# Patient Record
Sex: MALE | Race: BLACK OR AFRICAN AMERICAN | Employment: FULL TIME | URBAN - METROPOLITAN AREA
[De-identification: names, ages, dates, MRNs, and addresses within clinical notes are randomized per-mention and may not be internally consistent; named-entity substitution may affect disease eponyms.]

---

## 2024-12-10 ENCOUNTER — OFFICE VISIT (OUTPATIENT)
Dept: ENDOCRINOLOGY | Age: 44
End: 2024-12-10

## 2024-12-10 VITALS
HEART RATE: 90 BPM | SYSTOLIC BLOOD PRESSURE: 141 MMHG | WEIGHT: 315 LBS | DIASTOLIC BLOOD PRESSURE: 87 MMHG | BODY MASS INDEX: 39.17 KG/M2 | HEIGHT: 75 IN | OXYGEN SATURATION: 94 %

## 2024-12-10 DIAGNOSIS — E11.9 TYPE 2 DIABETES MELLITUS WITHOUT COMPLICATION, UNSPECIFIED WHETHER LONG TERM INSULIN USE (HCC): Primary | ICD-10-CM

## 2024-12-10 LAB
CHP ED QC CHECK: NORMAL
GLUCOSE BLD-MCNC: 215 MG/DL

## 2024-12-10 PROCEDURE — 82962 GLUCOSE BLOOD TEST: CPT | Performed by: PHYSICIAN ASSISTANT

## 2024-12-10 PROCEDURE — 99243 OFF/OP CNSLTJ NEW/EST LOW 30: CPT | Performed by: PHYSICIAN ASSISTANT

## 2024-12-10 RX ORDER — SIMVASTATIN 40 MG
40 TABLET ORAL NIGHTLY
COMMUNITY

## 2024-12-10 RX ORDER — ORAL SEMAGLUTIDE 3 MG/1
1 TABLET ORAL
Qty: 30 TABLET | Refills: 5 | Status: SHIPPED | OUTPATIENT
Start: 2024-12-10

## 2024-12-10 RX ORDER — HYDROCHLOROTHIAZIDE 12.5 MG/1
12.5 CAPSULE ORAL EVERY MORNING
COMMUNITY

## 2024-12-10 RX ORDER — ATORVASTATIN CALCIUM 40 MG/1
1 TABLET, FILM COATED ORAL
COMMUNITY

## 2024-12-10 RX ORDER — AMLODIPINE BESYLATE 10 MG/1
10 TABLET ORAL DAILY
COMMUNITY

## 2024-12-10 RX ORDER — LISINOPRIL 30 MG/1
1 TABLET ORAL
COMMUNITY

## 2024-12-10 RX ORDER — GLIMEPIRIDE 2 MG/1
2 TABLET ORAL
COMMUNITY

## 2024-12-10 ASSESSMENT — ENCOUNTER SYMPTOMS
SINUS PRESSURE: 0
DIARRHEA: 0
SORE THROAT: 0
RHINORRHEA: 0
WHEEZING: 0
SHORTNESS OF BREATH: 0
NAUSEA: 0
VOMITING: 0
COUGH: 0
ABDOMINAL PAIN: 0

## 2024-12-10 NOTE — PATIENT INSTRUCTIONS
Sitagliptin 100 mg by mouth daily  Metformin 1000 mg by mouth twice daily before breakfast and dinner  Glipizide 10 mg by mouth daily before breakfast and dinner  Repeat labs 1 week before your next appointment  Follow-up with me in 3 months

## 2024-12-10 NOTE — PROGRESS NOTES
12/10/2024    Assessment:       Diagnosis Orders   1. Type 2 diabetes mellitus without complication, unspecified whether long term insulin use (HCC)  Comprehensive Metabolic Panel    Hemoglobin A1C    POCT Glucose        AVG am glucose range 140-160    PLAN:     Sitagliptin 100 mg by mouth daily (Stop if you start Rybelsus)   Rybelsus 3 mg daily on an empty stomach   Metformin 1000 mg by mouth twice daily before breakfast and dinner  Glipizide 10 mg by mouth daily before breakfast and dinner  Repeat labs 1 week before your next appointment  Follow-up with me in 3 months      Orders Placed This Encounter   Procedures    Comprehensive Metabolic Panel     Standing Status:   Future     Standing Expiration Date:   12/10/2025    Hemoglobin A1C     Standing Status:   Future     Standing Expiration Date:   12/10/2025    POCT Glucose     Orders Placed This Encounter   Medications    Semaglutide (RYBELSUS) 3 MG TABS     Sig: Take 1 tablet by mouth every morning (before breakfast) Take mornings empty stomach 30 minutes before food     Dispense:  30 tablet     Refill:  5     Return in about 3 months (around 3/10/2025).  Subjective:     Chief Complaint   Patient presents with    Diabetes    New Patient     Vitals:    12/10/24 1510 12/10/24 1513   BP: (!) 134/90 (!) 141/87   Pulse: 90    SpO2: 94%    Weight: (!) 146.5 kg (323 lb)    Height: 1.905 m (6' 3\")      Wt Readings from Last 3 Encounters:   12/10/24 (!) 146.5 kg (323 lb)     BP Readings from Last 3 Encounters:   12/10/24 (!) 141/87     Yariel is a 44-year-old type II diabetic male.  He was being treated at [facility], his A1c continued to increase and he was sent here for evaluation and treatment.  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  His sitagliptin was just increased, he is on glipizide and metformin also.  I sent a prescription for Rybelsus to see what his out-of-pocket cost may be for this medication to replace the sitagliptin.